# Patient Record
Sex: MALE | Race: WHITE | NOT HISPANIC OR LATINO | Employment: OTHER | URBAN - METROPOLITAN AREA
[De-identification: names, ages, dates, MRNs, and addresses within clinical notes are randomized per-mention and may not be internally consistent; named-entity substitution may affect disease eponyms.]

---

## 2017-05-26 ENCOUNTER — HOSPITAL ENCOUNTER (OUTPATIENT)
Dept: RADIOLOGY | Facility: MEDICAL CENTER | Age: 82
End: 2017-05-26

## 2017-05-26 ENCOUNTER — HOSPITAL ENCOUNTER (OUTPATIENT)
Facility: MEDICAL CENTER | Age: 82
End: 2017-05-27
Attending: EMERGENCY MEDICINE | Admitting: INTERNAL MEDICINE
Payer: MEDICARE

## 2017-05-26 ENCOUNTER — RESOLUTE PROFESSIONAL BILLING HOSPITAL PROF FEE (OUTPATIENT)
Dept: HOSPITALIST | Facility: MEDICAL CENTER | Age: 82
End: 2017-05-26
Payer: MEDICARE

## 2017-05-26 PROBLEM — R55 NEAR SYNCOPE: Status: ACTIVE | Noted: 2017-05-26

## 2017-05-26 LAB
ALBUMIN SERPL BCP-MCNC: 3.7 G/DL (ref 3.2–4.9)
ALBUMIN/GLOB SERPL: 1.4 G/DL
ALP SERPL-CCNC: 101 U/L (ref 30–99)
ALT SERPL-CCNC: 12 U/L (ref 2–50)
ANION GAP SERPL CALC-SCNC: 9 MMOL/L (ref 0–11.9)
APTT PPP: 27.6 SEC (ref 24.7–36)
AST SERPL-CCNC: 19 U/L (ref 12–45)
BASOPHILS # BLD AUTO: 0.8 % (ref 0–1.8)
BASOPHILS # BLD: 0.05 K/UL (ref 0–0.12)
BILIRUB SERPL-MCNC: 0.4 MG/DL (ref 0.1–1.5)
BUN SERPL-MCNC: 13 MG/DL (ref 8–22)
CALCIUM SERPL-MCNC: 9.5 MG/DL (ref 8.5–10.5)
CHLORIDE SERPL-SCNC: 102 MMOL/L (ref 96–112)
CO2 SERPL-SCNC: 30 MMOL/L (ref 20–33)
CREAT SERPL-MCNC: 1.11 MG/DL (ref 0.5–1.4)
EKG IMPRESSION: NORMAL
EOSINOPHIL # BLD AUTO: 0.1 K/UL (ref 0–0.51)
EOSINOPHIL NFR BLD: 1.6 % (ref 0–6.9)
ERYTHROCYTE [DISTWIDTH] IN BLOOD BY AUTOMATED COUNT: 48.3 FL (ref 35.9–50)
GFR SERPL CREATININE-BSD FRML MDRD: >60 ML/MIN/1.73 M 2
GLOBULIN SER CALC-MCNC: 2.6 G/DL (ref 1.9–3.5)
GLUCOSE SERPL-MCNC: 129 MG/DL (ref 65–99)
HCT VFR BLD AUTO: 42.4 % (ref 42–52)
HGB BLD-MCNC: 13.8 G/DL (ref 14–18)
IMM GRANULOCYTES # BLD AUTO: 0.03 K/UL (ref 0–0.11)
IMM GRANULOCYTES NFR BLD AUTO: 0.5 % (ref 0–0.9)
INR PPP: 0.96 (ref 0.87–1.13)
LYMPHOCYTES # BLD AUTO: 1.21 K/UL (ref 1–4.8)
LYMPHOCYTES NFR BLD: 19 % (ref 22–41)
MCH RBC QN AUTO: 30.7 PG (ref 27–33)
MCHC RBC AUTO-ENTMCNC: 32.5 G/DL (ref 33.7–35.3)
MCV RBC AUTO: 94.2 FL (ref 81.4–97.8)
MONOCYTES # BLD AUTO: 0.34 K/UL (ref 0–0.85)
MONOCYTES NFR BLD AUTO: 5.3 % (ref 0–13.4)
NEUTROPHILS # BLD AUTO: 4.64 K/UL (ref 1.82–7.42)
NEUTROPHILS NFR BLD: 72.8 % (ref 44–72)
NRBC # BLD AUTO: 0 K/UL
NRBC BLD AUTO-RTO: 0 /100 WBC
PLATELET # BLD AUTO: 174 K/UL (ref 164–446)
PMV BLD AUTO: 9.4 FL (ref 9–12.9)
POTASSIUM SERPL-SCNC: 3.3 MMOL/L (ref 3.6–5.5)
PROT SERPL-MCNC: 6.3 G/DL (ref 6–8.2)
PROTHROMBIN TIME: 13.1 SEC (ref 12–14.6)
RBC # BLD AUTO: 4.5 M/UL (ref 4.7–6.1)
SODIUM SERPL-SCNC: 141 MMOL/L (ref 135–145)
TROPONIN I SERPL-MCNC: <0.01 NG/ML (ref 0–0.04)
WBC # BLD AUTO: 6.4 K/UL (ref 4.8–10.8)

## 2017-05-26 PROCEDURE — 700102 HCHG RX REV CODE 250 W/ 637 OVERRIDE(OP): Performed by: INTERNAL MEDICINE

## 2017-05-26 PROCEDURE — 700105 HCHG RX REV CODE 258: Performed by: INTERNAL MEDICINE

## 2017-05-26 PROCEDURE — 93005 ELECTROCARDIOGRAM TRACING: CPT | Performed by: EMERGENCY MEDICINE

## 2017-05-26 PROCEDURE — 304562 HCHG STAT O2 MASK/CANNULA

## 2017-05-26 PROCEDURE — 96372 THER/PROPH/DIAG INJ SC/IM: CPT

## 2017-05-26 PROCEDURE — A9270 NON-COVERED ITEM OR SERVICE: HCPCS | Performed by: INTERNAL MEDICINE

## 2017-05-26 PROCEDURE — 85025 COMPLETE CBC W/AUTO DIFF WBC: CPT

## 2017-05-26 PROCEDURE — 700111 HCHG RX REV CODE 636 W/ 250 OVERRIDE (IP): Performed by: INTERNAL MEDICINE

## 2017-05-26 PROCEDURE — 99220 PR INITIAL OBSERVATION CARE,LEVL III: CPT | Performed by: INTERNAL MEDICINE

## 2017-05-26 PROCEDURE — G0378 HOSPITAL OBSERVATION PER HR: HCPCS

## 2017-05-26 PROCEDURE — 80053 COMPREHEN METABOLIC PANEL: CPT

## 2017-05-26 PROCEDURE — 93005 ELECTROCARDIOGRAM TRACING: CPT

## 2017-05-26 PROCEDURE — 85730 THROMBOPLASTIN TIME PARTIAL: CPT

## 2017-05-26 PROCEDURE — 99285 EMERGENCY DEPT VISIT HI MDM: CPT

## 2017-05-26 PROCEDURE — 85610 PROTHROMBIN TIME: CPT

## 2017-05-26 PROCEDURE — 84484 ASSAY OF TROPONIN QUANT: CPT

## 2017-05-26 RX ORDER — LABETALOL HYDROCHLORIDE 5 MG/ML
10 INJECTION, SOLUTION INTRAVENOUS EVERY 4 HOURS PRN
Status: DISCONTINUED | OUTPATIENT
Start: 2017-05-26 | End: 2017-05-27 | Stop reason: HOSPADM

## 2017-05-26 RX ORDER — ONDANSETRON 2 MG/ML
4 INJECTION INTRAMUSCULAR; INTRAVENOUS EVERY 4 HOURS PRN
Status: DISCONTINUED | OUTPATIENT
Start: 2017-05-26 | End: 2017-05-27 | Stop reason: HOSPADM

## 2017-05-26 RX ORDER — FLUTICASONE PROPIONATE 110 UG/1
1 AEROSOL, METERED RESPIRATORY (INHALATION) 2 TIMES DAILY
COMMUNITY

## 2017-05-26 RX ORDER — POTASSIUM CHLORIDE 20 MEQ/1
40 TABLET, EXTENDED RELEASE ORAL ONCE
Status: COMPLETED | OUTPATIENT
Start: 2017-05-26 | End: 2017-05-26

## 2017-05-26 RX ORDER — MULTIVITAMIN
1 TABLET ORAL DAILY
COMMUNITY

## 2017-05-26 RX ORDER — ONDANSETRON 4 MG/1
4 TABLET, ORALLY DISINTEGRATING ORAL EVERY 4 HOURS PRN
Status: DISCONTINUED | OUTPATIENT
Start: 2017-05-26 | End: 2017-05-27 | Stop reason: HOSPADM

## 2017-05-26 RX ORDER — HEPARIN SODIUM 5000 [USP'U]/ML
5000 INJECTION, SOLUTION INTRAVENOUS; SUBCUTANEOUS EVERY 8 HOURS
Status: DISCONTINUED | OUTPATIENT
Start: 2017-05-26 | End: 2017-05-27 | Stop reason: HOSPADM

## 2017-05-26 RX ORDER — AMOXICILLIN 250 MG
2 CAPSULE ORAL 2 TIMES DAILY
Status: DISCONTINUED | OUTPATIENT
Start: 2017-05-26 | End: 2017-05-27 | Stop reason: HOSPADM

## 2017-05-26 RX ORDER — ACETAMINOPHEN 325 MG/1
650 TABLET ORAL EVERY 6 HOURS PRN
Status: DISCONTINUED | OUTPATIENT
Start: 2017-05-26 | End: 2017-05-27 | Stop reason: HOSPADM

## 2017-05-26 RX ORDER — POLYETHYLENE GLYCOL 3350 17 G/17G
1 POWDER, FOR SOLUTION ORAL
Status: DISCONTINUED | OUTPATIENT
Start: 2017-05-26 | End: 2017-05-27 | Stop reason: HOSPADM

## 2017-05-26 RX ORDER — ALLOPURINOL 300 MG/1
300 TABLET ORAL DAILY
COMMUNITY

## 2017-05-26 RX ORDER — LOVASTATIN 20 MG/1
20 TABLET ORAL DAILY
COMMUNITY

## 2017-05-26 RX ORDER — SODIUM CHLORIDE 9 MG/ML
INJECTION, SOLUTION INTRAVENOUS CONTINUOUS
Status: DISCONTINUED | OUTPATIENT
Start: 2017-05-26 | End: 2017-05-27 | Stop reason: HOSPADM

## 2017-05-26 RX ORDER — BISACODYL 10 MG
10 SUPPOSITORY, RECTAL RECTAL
Status: DISCONTINUED | OUTPATIENT
Start: 2017-05-26 | End: 2017-05-27 | Stop reason: HOSPADM

## 2017-05-26 RX ORDER — LISINOPRIL AND HYDROCHLOROTHIAZIDE 12.5; 1 MG/1; MG/1
1 TABLET ORAL DAILY
Status: ON HOLD | COMMUNITY
End: 2017-05-27

## 2017-05-26 RX ADMIN — SODIUM CHLORIDE: 9 INJECTION, SOLUTION INTRAVENOUS at 22:22

## 2017-05-26 RX ADMIN — POTASSIUM CHLORIDE 40 MEQ: 1500 TABLET, EXTENDED RELEASE ORAL at 20:58

## 2017-05-26 RX ADMIN — HEPARIN SODIUM 5000 UNITS: 5000 INJECTION, SOLUTION INTRAVENOUS; SUBCUTANEOUS at 22:22

## 2017-05-26 ASSESSMENT — LIFESTYLE VARIABLES
EVER FELT BAD OR GUILTY ABOUT YOUR DRINKING: NO
EVER_SMOKED: NEVER
CONSUMPTION TOTAL: NEGATIVE
TOTAL SCORE: 0
HAVE PEOPLE ANNOYED YOU BY CRITICIZING YOUR DRINKING: NO
HOW MANY TIMES IN THE PAST YEAR HAVE YOU HAD 5 OR MORE DRINKS IN A DAY: 0
TOTAL SCORE: 0
AVERAGE NUMBER OF DAYS PER WEEK YOU HAVE A DRINK CONTAINING ALCOHOL: 1
ALCOHOL_USE: YES
EVER HAD A DRINK FIRST THING IN THE MORNING TO STEADY YOUR NERVES TO GET RID OF A HANGOVER: NO
ON A TYPICAL DAY WHEN YOU DRINK ALCOHOL HOW MANY DRINKS DO YOU HAVE: 1
TOTAL SCORE: 0
HAVE YOU EVER FELT YOU SHOULD CUT DOWN ON YOUR DRINKING: NO

## 2017-05-26 NOTE — IP AVS SNAPSHOT
" Home Care Instructions                                                                                                                  Name:Ronald Brunner  Medical Record Number:[id number]  CSN: [id number]    YOB: 1930   Age: 86 y.o.  Sex: male  HT:1.702 m (5' 7.01\") WT: 67.9 kg (149 lb 11.1 oz)          Admit Date: 5/26/2017     Discharge Date:   Today's Date: 5/27/2017  Attending Doctor:  Oniel Gu M.D.                  Allergies:  Iodine            Discharge Instructions       Discharge Instructions    Discharged to home by car with relative. Discharged via walking, hospital escort: Yes.  Special equipment needed: Not Applicable    Be sure to schedule a follow-up appointment with your primary care doctor or any specialists as instructed.     Discharge Plan:   Diet Plan: Discussed  Activity Level: Discussed  Confirmed Follow up Appointment: Patient to Call and Schedule Appointment  Confirmed Symptoms Management: Discussed  Medication Reconciliation Updated: Yes  Pneumococcal Vaccine Given - only chart on this line when given: Given (See MAR)  Influenza Vaccine Indication: Indicated: Not available from distributor/    I understand that a diet low in cholesterol, fat, and sodium is recommended for good health. Unless I have been given specific instructions below for another diet, I accept this instruction as my diet prescription.   Other diet: Regular Diet    Special Instructions: None    · Is patient discharged on Warfarin / Coumadin?   No     · Is patient Post Blood Transfusion?  No    Depression / Suicide Risk    As you are discharged from this [facility] facility, it is important to learn how to keep safe from harming yourself.    Recognize the warning signs:  · Abrupt changes in personality, positive or negative- including increase in energy   · Giving away possessions  · Change in eating patterns- significant weight changes-  positive or negative  · Change in sleeping patterns- " unable to sleep or sleeping all the time   · Unwillingness or inability to communicate  · Depression  · Unusual sadness, discouragement and loneliness  · Talk of wanting to die  · Neglect of personal appearance   · Rebelliousness- reckless behavior  · Withdrawal from people/activities they love  · Confusion- inability to concentrate     If you or a loved one observes any of these behaviors or has concerns about self-harm, here's what you can do:  · Talk about it- your feelings and reasons for harming yourself  · Remove any means that you might use to hurt yourself (examples: pills, rope, extension cords, firearm)  · Get professional help from the community (Mental Health, Substance Abuse, psychological counseling)  · Do not be alone:Call your Safe Contact- someone whom you trust who will be there for you.  · Call your local CRISIS HOTLINE 225-7458 or 776-202-8155  · Call your local Children's Mobile Crisis Response Team [place] (267) 278-5391 or www.[employer]  · Call the toll free National Suicide Prevention Hotlines   · National Suicide Prevention Lifeline 167-652-NSAT (3277)  · National Hope Line Network 800-SUICIDE (014-7702)        Follow-up Information     1. Schedule an appointment as soon as possible for a visit with Your PCP.         Discharge Medication Instructions:    Below are the medications your physician expects you to take upon discharge:    Review all your home medications and newly ordered medications with your doctor and/or pharmacist. Follow medication instructions as directed by your doctor and/or pharmacist.    Please keep your medication list with you and share with your physician.               Medication List      CONTINUE taking these medications        Instructions    Morning Afternoon Evening Bedtime    albuterol-ipratropium  MCG/ACT Aero   Commonly known as:  COMBIVENT        Inhale 2 Puffs by mouth 4 times a day.   Dose:  2 Puff                        allopurinol 300  MG Tabs   Last time this was given:  300 mg on 5/27/2017 11:34 AM   Commonly known as:  ZYLOPRIM        Take 300 mg by mouth every day.   Dose:  300 mg                        fluticasone 110 MCG/ACT Aero   Last time this was given:  110 mcg on 5/27/2017  7:27 AM   Commonly known as:  FLOVENT HFA        Inhale 1 Puff by mouth 2 times a day.   Dose:  1 Puff                        lisinopril-hydrochlorothiazide 10-12.5 MG per tablet   Commonly known as:  PRINZIDE, ZESTORETIC        Take 1 Tab by mouth every day.   Dose:  1 Tab                        lovastatin 20 MG Tabs   Last time this was given:  20 mg on 5/27/2017 10:27 AM   Commonly known as:  MEVACOR        Take 20 mg by mouth every day.   Dose:  20 mg                        ONE-DAILY MULTIVITAMINS Tabs   Last time this was given:  1 Tab on 5/27/2017 10:26 AM        Take 1 Tab by mouth every day.   Dose:  1 Tab                                Instructions           Diet / Nutrition:    Follow any diet instructions given to you by your doctor or the dietician, including how much salt (sodium) you are allowed each day.    If you are overweight, talk to your doctor about a weight reduction plan.    Activity:    Remain physically active following your doctor's instructions about exercise and activity.    Rest often.     Any time you become even a little tired or short of breath, SIT DOWN and rest.    Worsening Symptoms:    Report any of the following signs and symptoms to the doctor's office immediately:    *Pain of jaw, arm, or neck  *Chest pain not relieved by medication                               *Dizziness or loss of consciousness  *Difficulty breathing even when at rest   *More tired than usual                                       *Bleeding drainage or swelling of surgical site  *Swelling of feet, ankles, legs or stomach                 *Fever (>100ºF)  *Pink or blood tinged sputum  *Weight gain (3lbs/day or 5lbs /week)           *Shock from internal  defibrillator (if applicable)  *Palpitations or irregular heartbeats                *Cool and/or numb extremities    Stroke Awareness    Common Risk Factors for Stroke include:    Age  Atrial Fibrillation  Carotid Artery Stenosis  Diabetes Mellitus  Excessive alcohol consumption  High blood pressure  Overweight   Physical inactivity  Smoking    Warning signs and symptoms of a stroke include:    *Sudden numbness or weakness of the face, arm or leg (especially on one side of the body).  *Sudden confusion, trouble speaking or understanding.  *Sudden trouble seeing in one or both eyes.  *Sudden trouble walking, dizziness, loss of balance or coordination.Sudden severe headache with no known cause.    It is very important to get treatment quickly when a stroke occurs. If you experience any of the above warning signs, call 911 immediately.                   Disclaimer         Quit Smoking / Tobacco Use:    I understand the use of any tobacco products increases my chance of suffering from future heart disease or stroke and could cause other illnesses which may shorten my life. Quitting the use of tobacco products is the single most important thing I can do to improve my health. For further information on smoking / tobacco cessation call a Toll Free Quit Line at 1-997.975.4894 (*National [employer] [place]) or 1-176.938.4802 (American Lung Association) or you can access the web based program at www.lungusa.org.    [place] Tobacco Users Help Line:  (223) 452-5915       Toll Free: 1-304.799.4895  Quit Tobacco Program [facility] (354)591-9733    Crisis Hotline:    [place] Crisis Hotline:  0-731-RZFRJDR or 1-329.133.5186    [place] Crisis Hotline:    1-224.371.6067 or 764-970-3742    Discharge Survey:   Thank you for choosing [facility]. We hope we did everything we could to make your hospital stay a pleasant one. You may be receiving a phone survey and we would appreciate your time and participation in  answering the questions. Your input is very valuable to us in our efforts to improve our service to our patients and their families.        My signature on this form indicates that:    1. I have reviewed and understand the above information.  2. My questions regarding this information have been answered to my satisfaction.  3. I have formulated a plan with my discharge nurse to obtain my prescribed medications for home.                  Disclaimer         __________________________________                     __________       ________                       Patient Signature                                                 Date                    Time

## 2017-05-27 VITALS
DIASTOLIC BLOOD PRESSURE: 61 MMHG | HEART RATE: 65 BPM | OXYGEN SATURATION: 96 % | SYSTOLIC BLOOD PRESSURE: 115 MMHG | TEMPERATURE: 97.6 F | HEIGHT: 67 IN | BODY MASS INDEX: 23.49 KG/M2 | WEIGHT: 149.69 LBS | RESPIRATION RATE: 19 BRPM

## 2017-05-27 PROBLEM — M10.9 GOUT: Chronic | Status: ACTIVE | Noted: 2017-05-27

## 2017-05-27 PROBLEM — J44.9 COPD (CHRONIC OBSTRUCTIVE PULMONARY DISEASE) (HCC): Status: ACTIVE | Noted: 2017-05-27

## 2017-05-27 PROBLEM — I10 HTN (HYPERTENSION): Status: ACTIVE | Noted: 2017-05-27

## 2017-05-27 PROBLEM — I51.89 DIASTOLIC DYSFUNCTION: Status: ACTIVE | Noted: 2017-05-27

## 2017-05-27 PROBLEM — J44.9 COPD (CHRONIC OBSTRUCTIVE PULMONARY DISEASE) (HCC): Chronic | Status: ACTIVE | Noted: 2017-05-27

## 2017-05-27 PROBLEM — I10 HTN (HYPERTENSION): Chronic | Status: ACTIVE | Noted: 2017-05-27

## 2017-05-27 PROBLEM — E78.5 DYSLIPIDEMIA: Chronic | Status: ACTIVE | Noted: 2017-05-27

## 2017-05-27 LAB
ANION GAP SERPL CALC-SCNC: 5 MMOL/L (ref 0–11.9)
BUN SERPL-MCNC: 13 MG/DL (ref 8–22)
CALCIUM SERPL-MCNC: 9 MG/DL (ref 8.5–10.5)
CHLORIDE SERPL-SCNC: 105 MMOL/L (ref 96–112)
CO2 SERPL-SCNC: 29 MMOL/L (ref 20–33)
CREAT SERPL-MCNC: 1.04 MG/DL (ref 0.5–1.4)
ERYTHROCYTE [DISTWIDTH] IN BLOOD BY AUTOMATED COUNT: 47.9 FL (ref 35.9–50)
GFR SERPL CREATININE-BSD FRML MDRD: >60 ML/MIN/1.73 M 2
GLUCOSE SERPL-MCNC: 99 MG/DL (ref 65–99)
HCT VFR BLD AUTO: 41.2 % (ref 42–52)
HGB BLD-MCNC: 13 G/DL (ref 14–18)
LV EJECT FRACT  99904: 65
LV EJECT FRACT MOD 2C 99903: 69.93
LV EJECT FRACT MOD 4C 99902: 60.98
LV EJECT FRACT MOD BP 99901: 64.2
MAGNESIUM SERPL-MCNC: 2.1 MG/DL (ref 1.5–2.5)
MCH RBC QN AUTO: 29.7 PG (ref 27–33)
MCHC RBC AUTO-ENTMCNC: 31.6 G/DL (ref 33.7–35.3)
MCV RBC AUTO: 94.3 FL (ref 81.4–97.8)
PLATELET # BLD AUTO: 157 K/UL (ref 164–446)
PMV BLD AUTO: 9.1 FL (ref 9–12.9)
POTASSIUM SERPL-SCNC: 3.7 MMOL/L (ref 3.6–5.5)
RBC # BLD AUTO: 4.37 M/UL (ref 4.7–6.1)
SODIUM SERPL-SCNC: 139 MMOL/L (ref 135–145)
TSH SERPL DL<=0.005 MIU/L-ACNC: 1.2 UIU/ML (ref 0.3–3.7)
WBC # BLD AUTO: 5.7 K/UL (ref 4.8–10.8)

## 2017-05-27 PROCEDURE — 99217 PR OBSERVATION CARE DISCHARGE: CPT | Performed by: INTERNAL MEDICINE

## 2017-05-27 PROCEDURE — G0378 HOSPITAL OBSERVATION PER HR: HCPCS

## 2017-05-27 PROCEDURE — 36415 COLL VENOUS BLD VENIPUNCTURE: CPT

## 2017-05-27 PROCEDURE — 93880 EXTRACRANIAL BILAT STUDY: CPT

## 2017-05-27 PROCEDURE — G8980 MOBILITY D/C STATUS: HCPCS | Mod: CJ

## 2017-05-27 PROCEDURE — 93306 TTE W/DOPPLER COMPLETE: CPT | Mod: 26 | Performed by: INTERNAL MEDICINE

## 2017-05-27 PROCEDURE — 96372 THER/PROPH/DIAG INJ SC/IM: CPT

## 2017-05-27 PROCEDURE — 93306 TTE W/DOPPLER COMPLETE: CPT

## 2017-05-27 PROCEDURE — 700102 HCHG RX REV CODE 250 W/ 637 OVERRIDE(OP): Performed by: INTERNAL MEDICINE

## 2017-05-27 PROCEDURE — 97161 PT EVAL LOW COMPLEX 20 MIN: CPT

## 2017-05-27 PROCEDURE — A9270 NON-COVERED ITEM OR SERVICE: HCPCS | Performed by: INTERNAL MEDICINE

## 2017-05-27 PROCEDURE — 84443 ASSAY THYROID STIM HORMONE: CPT

## 2017-05-27 PROCEDURE — 93880 EXTRACRANIAL BILAT STUDY: CPT | Mod: 26 | Performed by: SURGERY

## 2017-05-27 PROCEDURE — 85027 COMPLETE CBC AUTOMATED: CPT

## 2017-05-27 PROCEDURE — 90471 IMMUNIZATION ADMIN: CPT

## 2017-05-27 PROCEDURE — G8978 MOBILITY CURRENT STATUS: HCPCS | Mod: CJ

## 2017-05-27 PROCEDURE — 83735 ASSAY OF MAGNESIUM: CPT

## 2017-05-27 PROCEDURE — 80048 BASIC METABOLIC PNL TOTAL CA: CPT

## 2017-05-27 PROCEDURE — 700111 HCHG RX REV CODE 636 W/ 250 OVERRIDE (IP): Performed by: INTERNAL MEDICINE

## 2017-05-27 PROCEDURE — G8979 MOBILITY GOAL STATUS: HCPCS | Mod: CJ

## 2017-05-27 PROCEDURE — 90670 PCV13 VACCINE IM: CPT | Performed by: INTERNAL MEDICINE

## 2017-05-27 RX ORDER — TIOTROPIUM BROMIDE 18 UG/1
1 CAPSULE ORAL; RESPIRATORY (INHALATION) DAILY
Status: DISCONTINUED | OUTPATIENT
Start: 2017-05-28 | End: 2017-05-27 | Stop reason: HOSPADM

## 2017-05-27 RX ORDER — TIOTROPIUM BROMIDE 18 UG/1
1 CAPSULE ORAL; RESPIRATORY (INHALATION)
Status: DISCONTINUED | OUTPATIENT
Start: 2017-05-27 | End: 2017-05-27

## 2017-05-27 RX ORDER — ALBUTEROL SULFATE 90 UG/1
2 AEROSOL, METERED RESPIRATORY (INHALATION)
Status: DISCONTINUED | OUTPATIENT
Start: 2017-05-27 | End: 2017-05-27

## 2017-05-27 RX ORDER — LOVASTATIN 20 MG/1
20 TABLET ORAL DAILY
Status: DISCONTINUED | OUTPATIENT
Start: 2017-05-27 | End: 2017-05-27 | Stop reason: HOSPADM

## 2017-05-27 RX ORDER — LISINOPRIL 10 MG/1
10 TABLET ORAL
Status: DISCONTINUED | OUTPATIENT
Start: 2017-05-27 | End: 2017-05-27 | Stop reason: HOSPADM

## 2017-05-27 RX ORDER — ALBUTEROL SULFATE 90 UG/1
2 AEROSOL, METERED RESPIRATORY (INHALATION) 4 TIMES DAILY
Status: DISCONTINUED | OUTPATIENT
Start: 2017-05-27 | End: 2017-05-27 | Stop reason: HOSPADM

## 2017-05-27 RX ORDER — FLUTICASONE PROPIONATE 110 UG/1
1 AEROSOL, METERED RESPIRATORY (INHALATION) 2 TIMES DAILY
Status: DISCONTINUED | OUTPATIENT
Start: 2017-05-27 | End: 2017-05-27 | Stop reason: HOSPADM

## 2017-05-27 RX ORDER — HYDROCHLOROTHIAZIDE 12.5 MG/1
12.5 TABLET ORAL
Status: DISCONTINUED | OUTPATIENT
Start: 2017-05-27 | End: 2017-05-27 | Stop reason: HOSPADM

## 2017-05-27 RX ORDER — ALLOPURINOL 100 MG/1
300 TABLET ORAL DAILY
Status: DISCONTINUED | OUTPATIENT
Start: 2017-05-27 | End: 2017-05-27 | Stop reason: HOSPADM

## 2017-05-27 RX ORDER — LISINOPRIL AND HYDROCHLOROTHIAZIDE 12.5; 1 MG/1; MG/1
1 TABLET ORAL DAILY
Status: DISCONTINUED | OUTPATIENT
Start: 2017-05-27 | End: 2017-05-27

## 2017-05-27 RX ADMIN — ALBUTEROL SULFATE 2 PUFF: 90 AEROSOL, METERED RESPIRATORY (INHALATION) at 07:26

## 2017-05-27 RX ADMIN — PNEUMOCOCCAL 13-VALENT CONJUGATE VACCINE 0.5 ML: 2.2; 2.2; 2.2; 2.2; 2.2; 4.4; 2.2; 2.2; 2.2; 2.2; 2.2; 2.2; 2.2 INJECTION, SUSPENSION INTRAMUSCULAR at 11:44

## 2017-05-27 RX ADMIN — TIOTROPIUM BROMIDE 1 CAPSULE: 18 CAPSULE ORAL; RESPIRATORY (INHALATION) at 07:27

## 2017-05-27 RX ADMIN — LISINOPRIL 10 MG: 10 TABLET ORAL at 10:27

## 2017-05-27 RX ADMIN — THERA TABS 1 TABLET: TAB at 10:26

## 2017-05-27 RX ADMIN — STANDARDIZED SENNA CONCENTRATE AND DOCUSATE SODIUM 2 TABLET: 8.6; 5 TABLET, FILM COATED ORAL at 10:26

## 2017-05-27 RX ADMIN — HYDROCHLOROTHIAZIDE 12.5 MG: 12.5 TABLET ORAL at 10:27

## 2017-05-27 RX ADMIN — ALLOPURINOL 300 MG: 100 TABLET ORAL at 11:34

## 2017-05-27 RX ADMIN — LOVASTATIN 20 MG: 20 TABLET ORAL at 10:27

## 2017-05-27 RX ADMIN — FLUTICASONE PROPIONATE 110 MCG: 110 AEROSOL, METERED RESPIRATORY (INHALATION) at 07:27

## 2017-05-27 RX ADMIN — HEPARIN SODIUM 5000 UNITS: 5000 INJECTION, SOLUTION INTRAVENOUS; SUBCUTANEOUS at 12:58

## 2017-05-27 RX ADMIN — ALBUTEROL SULFATE 2 PUFF: 90 AEROSOL, METERED RESPIRATORY (INHALATION) at 12:57

## 2017-05-27 RX ADMIN — HEPARIN SODIUM 5000 UNITS: 5000 INJECTION, SOLUTION INTRAVENOUS; SUBCUTANEOUS at 06:11

## 2017-05-27 ASSESSMENT — COGNITIVE AND FUNCTIONAL STATUS - GENERAL
WALKING IN HOSPITAL ROOM: A LITTLE
MOBILITY SCORE: 21
SUGGESTED CMS G CODE MODIFIER MOBILITY: CJ
CLIMB 3 TO 5 STEPS WITH RAILING: A LITTLE
STANDING UP FROM CHAIR USING ARMS: A LITTLE

## 2017-05-27 ASSESSMENT — GAIT ASSESSMENTS
GAIT LEVEL OF ASSIST: SUPERVISED
DISTANCE (FEET): 200

## 2017-05-27 ASSESSMENT — COPD QUESTIONNAIRES
DO YOU EVER COUGH UP ANY MUCUS OR PHLEGM?: YES, A FEW DAYS A WEEK OR MONTH
DURING THE PAST 4 WEEKS HOW MUCH DID YOU FEEL SHORT OF BREATH: SOME OF THE TIME
COPD SCREENING SCORE: 4
HAVE YOU SMOKED AT LEAST 100 CIGARETTES IN YOUR ENTIRE LIFE: NO/DON'T KNOW

## 2017-05-27 ASSESSMENT — LIFESTYLE VARIABLES
EVER_SMOKED: YES
EVER_SMOKED: YES

## 2017-05-27 ASSESSMENT — PAIN SCALES - GENERAL: PAINLEVEL_OUTOF10: 0

## 2017-05-27 NOTE — DISCHARGE PLANNING
Care Transition Team Assessment    Information Source  Orientation : Oriented x 4 (can be forgetful but otherwise is able to cont to track)  Information Given By: Patient  Who is responsible for making decisions for patient? : Patient         Elopement Risk  Legal Hold: No  Ambulatory or Self Mobile in Wheelchair: Yes  Disoriented: No  Psychiatric Symptoms: None  History of Wandering: No  Elopement this Admit: No  Vocalizing Wanting to Leave: No  Displays Behaviors, Body Language Wanting to Leave: No-Not at Risk for Elopement    Interdisciplinary Discharge Planning  Does Admitting Nurse Feel This Could be a Complex Discharge?: No  Primary Care Physician: PCP in [place]  Lives with - Patient's Self Care Capacity: Spouse  Support Systems: Spouse / Significant Other  Housing / Facility: Mobile Home (5 steps into home)  Do You Take your Prescribed Medications Regularly: Yes  Able to Return to Previous ADL's: Yes  Mobility Issues: No  Prior Services: None  Patient Expects to be Discharged to:: home  Assistance Needed: No  Durable Medical Equipment: Not Applicable    Discharge Preparedness  What is your plan after discharge?: Home with help  What are your discharge supports?: Spouse  Prior Functional Level: Ambulatory, Drives Self, Independent with Activities of Daily Living    Functional Assesment  Prior Functional Level: Ambulatory, Drives Self, Independent with Activities of Daily Living                                  Discharge Risks or Barriers  Discharge risks or barriers?: No    Anticipated Discharge Information  Anticipated discharge disposition: Home  Discharge Address: [address] [place] [address] [address]  Discharge Contact Phone Number: [phone]

## 2017-05-27 NOTE — H&P
DATE OF SERVICE:  05/26/2017    CHIEF COMPLAINT:  Near syncope.    HISTORY OF PRESENT ILLNESS:  This is an 86-year-old male with a past medical   history significant for COPD on 2.5 liters supplemental oxygen at night,   hypertension, dyslipidemia who was transferred here from outside facility for   evaluation of near syncope.  Patient states that he went to [facility]   today for evaluation of near syncope.  Patient woke up early in this morning   when he was walking to the bathroom.  He became lightheaded and leaned against   the wall.  The patient states that he did not hit his head or lose   consciousness.  He denies falling or hitting his head.  Patient states that   later in the afternoon when he woke up from a nap, he had similar symptoms, he   decided to go to the ER for further evaluation.  Patient also states that he   has been having palpitations and has been dizzy associated with it.  Patient   denies any shortness of breath, chest pain, fever, chills, nausea, vomiting,   diarrhea, constipation, abdominal pain and recent illnesses.  Patient denies   any vision changes, loss of consciousness, slurred speech, numbness, tingling.    REVIEW OF SYSTEMS:  A comprehensive review of systems was conducted and all   pertinent positive and negative are documented in the HPI.    PAST MEDICAL HISTORY:  COPD on 2.5 liters supplemental oxygen at night,   hypertension, dyslipidemia and gout.    PAST SURGICAL HISTORY:  Significant for tonsillectomy.    SOCIAL HISTORY:  Patient is a former smoker, used to smoke 1 pack a day for   35-40 years, quit approximately 10 years ago.  Denies any alcohol or illicit   drug use.    FAMILY HISTORY:  Significant for father, who had leukemia.    ALLERGIES:  PATIENT IS ALLERGIC TO IODINE.    HOME MEDICATIONS:  Combivent 2 puffs q.i.d., allopurinol 300 mg daily, Flovent   1 puff twice a day, lisinopril/HCTZ 10-12.5 mg 1 tablet daily, lovastatin 20   mg daily,  multivitamin.    PHYSICAL EXAMINATION:  VITAL SIGNS:  Temperature of 36.4, heart rate of 77, respirations of 14, blood   pressure of 154/80, O2 saturation 96% on 3 liters nasal cannula.  GENERAL:  This is a pleasant, cooperative 86-year-old male, in no acute   distress.  HEENT:  Normocephalic, atraumatic.  Pupils are equal and reactive to light.    Extraocular motions intact.  Moist oral mucosa.  No oral exudate or erythema   noted.  NECK:  Supple, no lymphadenopathy or JVD noted.  CARDIOVASCULAR:  Regular rate and rhythm.  No murmurs, rubs, gallops noted.  LUNGS:  Clear to auscultation bilaterally.  No rhonchi, wheezes, or rales   heard.  ABDOMEN:  Soft, nontender, and nondistended.  Bowel sounds present.  EXTREMITIES:  No clubbing, cyanosis or edema noted.  NEUROLOGIC:  Alert, awake, oriented x3.  No focal deficits noted.  Cranial   nerves II-XII are grossly intact.  Patient is able to move all 4 extremities.  PSYCHIATRIC:  Normal mood, normal affect.     LABORATORY DATA:  WBC 6.4, hemoglobin of 13.8, hematocrit 42.4, platelet count   of 174.  INR 0.98, potassium of 3.3, glucose of 129.  Troponin less than   0.01.    ASSESSMENT AND PLAN:  1.  For the patient's near syncope.  The patient will be admitted and   monitored closely on telemetry.  Echocardiogram and ultrasound carotid are   ordered for further evaluation.  Physical therapy evaluation has been placed.    Orthostatic blood pressure is ordered.  2.  Gout.  Patient continued on Allopurinol 300 mg daily.  .  Hypertension.  Patient continued on lisinopril/HCTZ 10/12.5 mg daily.  5.  Hyperlipidemia.  Patient continued on Lovastatin 20 mg daily.  6.  Chronic obstructive pulmonary disease.  No acute exacerbation.  Patient is   at baseline.  Patient continued on home regimen.  RT protocol is ordered.    DISPOSITION:  Observation.    PROPHYLAXIS:  Patient started on heparin subQ for DVT prophylaxis.  No GI   prophylaxis indicated.  Bowel protocol  initiated.    CODE STATUS:  Full.       ____________________________________     CAROLYNE MURRAY MD MS / VIVIEN    DD:  05/27/2017 01:49:06  DT:  05/27/2017 02:28:33    D#:  [id number]  Job#:  [id number]

## 2017-05-27 NOTE — ED NOTES
Med rec complete per pt and pt's wife @ [phone]  Allergies reviewed and updated  No ABX in last month

## 2017-05-27 NOTE — THERAPY
"Pt is 86 y.o. male presenting with shortness of breath and dizziness from outside hospital. Pt w/ known history of emphysema and nocturnal O2 usage. Pt denies falls. PT eval reveal good balance and strength; mobility primarily limited secondary to poor gas exchange. Desaturating on RA w/ mobility to 79%, pt denies dyspnea. w/ 2L O2  raising to 93%. At this time pt with no further acute care PT needs but will require O2 w/ mobility during the day at this time. No further acute care PT needs at this time.     Physical Therapy Evaluation completed.   Bed Mobility:  Supine to Sit: Independent  Transfers: Sit to Stand: Independent  Gait: Level Of Assist: Supervised with No Equipment Needed       Plan of Care: Patient with no further skilled PT needs in the acute care setting at this time  Discharge Recommendations: Equipment: No Equipment Needed. Post-acute therapy Currently anticipate no further skilled therapy needs once patient is discharged from the inpatient setting.    Serena Gates PT, [phone]    See \"Rehab Therapy-Acute\" Patient Summary Report for complete documentation.     "

## 2017-05-27 NOTE — DISCHARGE INSTRUCTIONS
Discharge Instructions    Discharged to home by car with relative. Discharged via walking, hospital escort: Yes.  Special equipment needed: Not Applicable    Be sure to schedule a follow-up appointment with your primary care doctor or any specialists as instructed.     Discharge Plan:   Diet Plan: Discussed  Activity Level: Discussed  Confirmed Follow up Appointment: Patient to Call and Schedule Appointment  Confirmed Symptoms Management: Discussed  Medication Reconciliation Updated: Yes  Pneumococcal Vaccine Given - only chart on this line when given: Given (See MAR)  Influenza Vaccine Indication: Indicated: Not available from distributor/    I understand that a diet low in cholesterol, fat, and sodium is recommended for good health. Unless I have been given specific instructions below for another diet, I accept this instruction as my diet prescription.   Other diet: Regular Diet    Special Instructions: None    · Is patient discharged on Warfarin / Coumadin?   No     · Is patient Post Blood Transfusion?  No    Depression / Suicide Risk    As you are discharged from this [facility] facility, it is important to learn how to keep safe from harming yourself.    Recognize the warning signs:  · Abrupt changes in personality, positive or negative- including increase in energy   · Giving away possessions  · Change in eating patterns- significant weight changes-  positive or negative  · Change in sleeping patterns- unable to sleep or sleeping all the time   · Unwillingness or inability to communicate  · Depression  · Unusual sadness, discouragement and loneliness  · Talk of wanting to die  · Neglect of personal appearance   · Rebelliousness- reckless behavior  · Withdrawal from people/activities they love  · Confusion- inability to concentrate     If you or a loved one observes any of these behaviors or has concerns about self-harm, here's what you can do:  · Talk about it- your feelings and reasons for  harming yourself  · Remove any means that you might use to hurt yourself (examples: pills, rope, extension cords, firearm)  · Get professional help from the community (Mental Health, Substance Abuse, psychological counseling)  · Do not be alone:Call your Safe Contact- someone whom you trust who will be there for you.  · Call your local CRISIS HOTLINE 052-4340 or 169-472-9061  · Call your local Children's Mobile Crisis Response Team [place] (626) 761-2260 or www.[employer]  · Call the toll free National Suicide Prevention Hotlines   · National Suicide Prevention Lifeline 189-181-UQZJ (5568)  · National Hope Line Network 800-SUICIDE (853-0937)

## 2017-05-27 NOTE — PROGRESS NOTES
Seen by MD and clear for discharged. Family member at bedside and did health teaching. Got a follow up [place] for PCP.

## 2017-05-27 NOTE — ED NOTES
Chief Complaint   Patient presents with   • Syncope     Pt had 2 syncopal episodes earlier today.  Pt was transferred from [facility] for further evaluation and treatment.  Per report lab was down and they were unable to run cardiac markers.  Pt recieved 324 mg ASA pta.    Pt bib [facility] for above chief complaint.  Pt appears mildly disoriented, AA&Ox3, disoriented to date, knows person, place, situation and president.  EKG completed and shown to ERP.

## 2017-05-27 NOTE — ED PROVIDER NOTES
ED Provider Note    CHIEF COMPLAINT  Chief Complaint   Patient presents with   • Syncope     Pt had 2 syncopal episodes earlier today.  Pt was transferred from [facility] for further evaluation and treatment.  Per report lab was down and they were unable to run cardiac markers.  Pt recieved 324 mg ASA pta.        HPI  Ronald Brunner is a 86 y.o. male who presents as a transfer from [facility] for evaluation of syncope. In speaking with the patient it sounds as if he had to probably near syncopal episodes today. He was at home this morning when he was walking to the bathroom. He states he became very lightheaded and slumped against the wall but never fell to the ground. He had a similar episode later this afternoon which prompted him to go to the emergency department for evaluation. Interestingly he states that recently he's been having episodes where he's felt his heart beating irregularly. He denies any real chest pain associated with any of this. He was treated with aspirin prior to transfer. It appears he had an EKG and a chest x-ray performed prior to transfer. At the time of my evaluation he is resting comfortably and has no complaints. He denies any recent illnesses.    REVIEW OF SYSTEMS  See HPI for further details. All other systems are negative.     PAST MEDICAL HISTORY  Past Medical History   Diagnosis Date   • Chronic obstructive pulmonary disease (CMS-[facility])        FAMILY HISTORY  History reviewed. No pertinent family history.    SOCIAL HISTORY  Social History     Social History   • Marital Status:      Spouse Name: N/A   • Number of Children: N/A   • Years of Education: N/A     Social History Main Topics   • Smoking status: Former Smoker   • Smokeless tobacco: None   • Alcohol Use: No   • Drug Use: No   • Sexual Activity: Not Asked     Other Topics Concern   • None     Social History Narrative   • None       SURGICAL HISTORY  History reviewed. No pertinent past surgical  "history.    CURRENT MEDICATIONS  Home Medications     **Home medications have not yet been reviewed for this encounter**          ALLERGIES  Allergies not on file    PHYSICAL EXAM  VITAL SIGNS: Pulse 83  Temp(Src) 36.2 °C (97.2 °F)  Resp 16  Ht 1.702 m (5' 7.01\")  Wt 74.844 kg (165 lb)  BMI 25.84 kg/m2  SpO2 98%    Constitutional: Well developed, Well nourished, No acute distress, Non-toxic appearance.   HENT: Normocephalic, Atraumatic.   Eyes:  EOMI, Conjunctiva normal, No discharge.   Cardiovascular: Normal heart rate, Normal rhythm, No murmurs, No rubs, No gallops.   Thorax & Lungs: Lungs clear to auscultation bilaterally without wheezes, rales or rhonchi. No respiratory distress.    Abdomen: Soft and nontender.  Skin: Warm, Dry.   Extremities:  No edema, No cyanosis. No calf tenderness or swelling.  Musculoskeletal: Good range of motion in all major joints.  Neurologic: Awake alert , No focal deficits noted.       EKG  EKG Interpretation    Interpreted by emergency department physician    Rhythm: normal sinus   Rate: normal  Axis: normal  Ectopy: none  Conduction: normal  ST Segments: no acute change  T Waves: no acute change  Q Waves: none    Clinical Impression: no acute changes        RADIOLOGY/PROCEDURES  OUTSIDE IMAGES-DX CHEST   Preliminary Result      Echocardiogram Comp W/O Cont    (Results Pending)   Carotid Duplex ([facility] Only)    (Results Pending)         COURSE & MEDICAL DECISION MAKING  Pertinent Labs & Imaging studies reviewed. (See chart for details)  This is an 86-year-old here as a transfer for evaluation of syncope. In speaking with the patient it sounds more like 2 separate near syncopal episodes. He is asymptomatic at this time. His EKG is normal. Laboratories included a troponin I which is negative. Chemistries are normal with the exception of a glucose of 129 and a potassium at 3.3. INR is normal. CBC shows normal white count with a differential of 72 polys and 19 " lymphocytes. I discussed results the test with the patient. He remains asymptomatic. He will require admission for further evaluation and treatment. Based on his description of palpitations he's been having them concerned he may be having paroxysmal atrial fibrillation as a cause of his symptoms today. I discussed the case with Dr. Collins of the hospitalist service and she will be the primary admitting physician.    FINAL IMPRESSION  1. Near syncope  2.   3.         Electronically signed by: Teo Juarez, 5/26/2017 9:39 PM

## 2017-05-27 NOTE — PROGRESS NOTES
Arrived to floor via melva,a/o,assessment completed per CDU,poc discussed,verbalized understanding,will continue to monitor.

## 2017-05-28 NOTE — DISCHARGE SUMMARY
DATE OF ADMISSION:  05/26/2017    DATE OF DISCHARGE:  05/27/2017    PRIMARY CARE PROVIDER:  Unlisted, she will be given a primary care appointment   at discharge by our schedulers.    CONSULTS:  None.    PROCEDURES PERFORMED:  None.    DISCHARGE DIAGNOSES:  Near syncope and diastolic dysfunction grade I.    CHRONIC MEDICAL PROBLEMS:  COPD, dyslipidemia, gout, hypertension, although   during admission, he was somewhat hypotensive.    RESULTS REVIEW:  Interval history/exam for day of discharge:    Filed Vitals:    05/27/17 0745 05/27/17 0750 05/27/17 1000 05/27/17 1148   BP: 126/71 126/69  115/61   Pulse: 71 80 82 65   Temp:    36.4 °C (97.6 °F)   Resp:   18 19   Height:       Weight:       SpO2:   91% 96%     Weight/BMI: Body mass index is 23.44 kg/(m^2).  Pulse Oximetry: 96 %, O2 (LPM): 2, O2 Delivery: Nasal Cannula      Most Recent Labs:    Lab Results   Component Value Date/Time    WBC 5.7 05/27/2017 04:30 AM    RBC 4.37* 05/27/2017 04:30 AM    HEMOGLOBIN 13.0* 05/27/2017 04:30 AM    HEMATOCRIT 41.2* 05/27/2017 04:30 AM    MCV 94.3 05/27/2017 04:30 AM    MCH 29.7 05/27/2017 04:30 AM    MCHC 31.6* 05/27/2017 04:30 AM    MPV 9.1 05/27/2017 04:30 AM    NEUTROPHILS-POLYS 72.80* 05/26/2017 06:45 PM    LYMPHOCYTES 19.00* 05/26/2017 06:45 PM    MONOCYTES 5.30 05/26/2017 06:45 PM    EOSINOPHILS 1.60 05/26/2017 06:45 PM    BASOPHILS 0.80 05/26/2017 06:45 PM      Lab Results   Component Value Date/Time    SODIUM 139 05/27/2017 04:30 AM    POTASSIUM 3.7 05/27/2017 04:30 AM    CHLORIDE 105 05/27/2017 04:30 AM    CO2 29 05/27/2017 04:30 AM    GLUCOSE 99 05/27/2017 04:30 AM    BUN 13 05/27/2017 04:30 AM    CREATININE 1.04 05/27/2017 04:30 AM      Lab Results   Component Value Date/Time    ALT(SGPT) 12 05/26/2017 06:45 PM    AST(SGOT) 19 05/26/2017 06:45 PM    ALKALINE PHOSPHATASE 101* 05/26/2017 06:45 PM    TOTAL BILIRUBIN 0.4 05/26/2017 06:45 PM    ALBUMIN 3.7 05/26/2017 06:45 PM    GLOBULIN 2.6 05/26/2017 06:45 PM    INR 0.96  05/26/2017 06:45 PM     Lab Results   Component Value Date/Time    PT 13.1 05/26/2017 06:45 PM    INR 0.96 05/26/2017 06:45 PM        Imaging/ Testing:      Carotid Duplex ([facility] Only)   Final Result      Echocardiogram Comp W/O Cont   Final Result      OUTSIDE IMAGES-DX CHEST   Preliminary Result                MEDICATIONS:     Medication List      CONTINUE taking these medications       Instructions    albuterol-ipratropium  MCG/ACT Aero   Commonly known as:  COMBIVENT    Inhale 2 Puffs by mouth 4 times a day.   Dose:  2 Puff       allopurinol 300 MG Tabs   Last time this was given:  300 mg on 5/27/2017 11:34 AM   Commonly known as:  ZYLOPRIM    Take 300 mg by mouth every day.   Dose:  300 mg       fluticasone 110 MCG/ACT Aero   Last time this was given:  110 mcg on 5/27/2017  7:27 AM   Commonly known as:  FLOVENT HFA    Inhale 1 Puff by mouth 2 times a day.   Dose:  1 Puff       lovastatin 20 MG Tabs   Last time this was given:  20 mg on 5/27/2017 10:27 AM   Commonly known as:  MEVACOR    Take 20 mg by mouth every day.   Dose:  20 mg       ONE-DAILY MULTIVITAMINS Tabs   Last time this was given:  1 Tab on 5/27/2017 10:26 AM    Take 1 Tab by mouth every day.   Dose:  1 Tab         STOP taking these medications          lisinopril-hydrochlorothiazide 10-12.5 MG per tablet   Commonly known as:  PRINZIDE, ZESTORETIC               BRIEF HOSPITAL COURSE AND DECISION MAKING:  Please see H and P dictated by Dr. Jailene Collins on May 27th.  In brief, this is an 86-year-old male with the past   medical history of COPD on 2.5 L of supplemental oxygen, hypertension, and   dyslipidemia that was transferred here for near syncope.  This occurred when   he had woken up early in the morning, he was walking to the bathroom, became   lightheaded and lied against the wall this again occurred after a nap later on   the day with similar symptoms.  Therefore, he presented to the hospital.    During his  hospitalization, his dizziness and near syncope episode resolved.    He was maintained on IV fluids.  Echocardiogram was performed and he is found   to have a grade I diastolic dysfunction, otherwise unremarkable.  Carotid   ultrasounds were performed that showed less than 50% stenosis.  Physical   therapy cleared him and he was not orthostatic.  He will hold his blood   pressure medicines for now as he was somewhat hypotensive and we are   recommending that he follow up with his primary care provider at discharge.    He may resume activity as tolerated and resume a heart healthy diet.    Instructions:      The patient was instructed to return to the ER in the event of worsening symptoms. I have counseled the patient on the importance of compliance and the patient has agreed to proceed with all medical recommendations and follow up plan indicated above.   The patient understands that all medications come with benefits and risks. Risks may include permanent injury or death and these risks can be minimized with close reassessment and monitoring.        Opioid prescription history checked: no    Time spent in coordination of discharge was 35 minutes. This included face to face with the patient, medication reconciliation, care co ordination with RN involved in patient care and discussion and co ordination with case management.            ____________________________________     TOÑO Negron    DD:  05/27/2017 16:20:34  DT:  05/28/2017 07:38:10    D#:  [id number]  Job#:  [id number]